# Patient Record
Sex: MALE | HISPANIC OR LATINO | Employment: FULL TIME | URBAN - METROPOLITAN AREA
[De-identification: names, ages, dates, MRNs, and addresses within clinical notes are randomized per-mention and may not be internally consistent; named-entity substitution may affect disease eponyms.]

---

## 2022-08-07 NOTE — TELEPHONE ENCOUNTER
FUTURE VISIT INFORMATION      FUTURE VISIT INFORMATION:    Date: 9/8/22    Time: 8:20AM    Location: [facility]  REFERRAL INFORMATION:    Referring provider:      Referring providers clinic:     Reason for visit/diagnosis  Chronic hearing loss in left ear. Audio prior. Appt per pt. Pt will call back with insurance information.    RECORDS REQUESTED FROM:       Clinic name Comments Records Status Imaging Status   [facility]  11/7/18 note from Mari Chappell MD  Care everywhere

## 2022-09-07 DIAGNOSIS — Z01.10 ENCOUNTER FOR HEARING TEST: Primary | ICD-10-CM

## 2022-09-08 ENCOUNTER — OFFICE VISIT (OUTPATIENT)
Dept: OTOLARYNGOLOGY | Facility: CLINIC | Age: 27
End: 2022-09-08

## 2022-09-08 ENCOUNTER — PRE VISIT (OUTPATIENT)
Dept: OTOLARYNGOLOGY | Facility: CLINIC | Age: 27
End: 2022-09-08

## 2022-09-08 ENCOUNTER — OFFICE VISIT (OUTPATIENT)
Dept: AUDIOLOGY | Facility: CLINIC | Age: 27
End: 2022-09-08

## 2022-09-08 VITALS
WEIGHT: 159 LBS | BODY MASS INDEX: 26.49 KG/M2 | OXYGEN SATURATION: 99 % | TEMPERATURE: 97.9 F | SYSTOLIC BLOOD PRESSURE: 127 MMHG | DIASTOLIC BLOOD PRESSURE: 89 MMHG | HEIGHT: 65 IN | HEART RATE: 75 BPM

## 2022-09-08 DIAGNOSIS — H90.42 SENSORINEURAL HEARING LOSS (SNHL) OF LEFT EAR WITH UNRESTRICTED HEARING OF RIGHT EAR: Primary | ICD-10-CM

## 2022-09-08 DIAGNOSIS — H91.92: Primary | ICD-10-CM

## 2022-09-08 PROCEDURE — 92557 COMPREHENSIVE HEARING TEST: CPT | Performed by: AUDIOLOGIST

## 2022-09-08 PROCEDURE — 92565 STENGER TEST PURE TONE: CPT | Performed by: AUDIOLOGIST

## 2022-09-08 PROCEDURE — 99203 OFFICE O/P NEW LOW 30 MIN: CPT | Performed by: REGISTERED NURSE

## 2022-09-08 PROCEDURE — 92550 TYMPANOMETRY & REFLEX THRESH: CPT | Performed by: AUDIOLOGIST

## 2022-09-08 ASSESSMENT — PAIN SCALES - GENERAL: PAINLEVEL: NO PAIN (0)

## 2022-09-08 NOTE — PATIENT INSTRUCTIONS
- You were seen in the ENT Clinic today by Vanessa Sen NP.   - Schedule MRI. Will call with results once available.  - Please send a [employer] message or call the ENT clinic at [phone] with any questions or concerns.

## 2022-09-08 NOTE — LETTER
Date:September 9, 2022      Patient was self referred, no letter generated. Do not send.        [facility] Health Information

## 2022-09-08 NOTE — NURSING NOTE
"Chief Complaint   Patient presents with     Consult     Hearing loss -left ear      Blood pressure 127/89, pulse 75, temperature 97.9  F (36.6  C), height 1.66 m (5' 5.35\"), weight 72.1 kg (159 lb), SpO2 99 %.    Truong García LPN    "

## 2022-09-08 NOTE — PROGRESS NOTES
"  Otolaryngology Clinic  September 8, 2022    Chief Complaint:   Left hearing loss.       History of Present Illness:   Reinier Mckeon is a 27 year old male who presents today for evaluation of left hearing loss. Patient's history is obtained with assistance of an in person .  Patient states that he has noticed a gradual left-sided hearing loss became more severe about a year ago.  Has not been able to hear out of the left ear for about a year.  Clinic notes from outside clinic in 2018 document left sided hearing concerns, however, no formal audiogram was completed.     Patient states that he does have some dizziness that only lasts minutes at a time this happens about once every few weeks.  Patient denies any room spinning vertigo.  Also reports feeling like he is having some memory issues that have worsened over the last year.  Patient does have left-sided tinnitus which is more noticeable when he tries to listen to music with bilateral headphones.  Intermittent left aural pressure.  Patient denies any facial numbness/weakness or history of Bell's palsy.  Denies any family history of hearing loss or significant loud noise exposure.  Denies history of significant head trauma.    Past Medical History:  No past medical history on file.    Past Surgical History:  No past surgical history on file.    Medications:  No current outpatient medications on file.       Allergies:  No Known Allergies     Social History:  Social History     Tobacco Use     Smoking status: Current Some Day Smoker     Smokeless tobacco: Never Used     Tobacco comment: smokes socially once per month        ROS: 10 point ROS neg other than the symptoms noted above in the HPI.    Physical Exam:    /89   Pulse 75   Temp 97.9  F (36.6  C)   Ht 1.66 m (5' 5.35\")   Wt 72.1 kg (159 lb)   SpO2 99%   BMI 26.17 kg/m       Constitutional:  The patient was unaccompanied, well-groomed, and in no acute distress.   "   Neurologic: Alert and oriented x 3.  HB 1/6 bilaterally. Voice normal.    Psychiatric: The patient's affect was calm, cooperative, and appropriate.     Communication:  Normal; communicates verbally, normal voice quality.    Respiratory: Breathing comfortably without stridor or exertion of accessory muscles.    Ears: Pinnae and tragus non-tender.        Audiogram: 9/8/2022- data independently reviewed  Right ear: Normal hearing  Left ear: No response to machine output limit   WR right: 100% at 55 dB left: 0% at 105 dB  Acoustic Reflexes: Present in right ipsi and left contra.  Absent in other conditions.  Tympanograms: type A bilaterally         Assessment and Plan:  1. Complete hearing loss of left ear  Patient with 1 year history of complete hearing loss per patient report.  This seems to have been a gradual hearing loss as left hearing concerns have been reported dating back to 2018.  There does not seem to be any significant vertiginous events the patient has experienced, however, does admit to intermittent dizziness.  Denies any facial weakness or numbness.  Full facial nerve function on today's exam.    Reviewed audiogram with patient today that shows deafness in the left ear.  Reviewed possible causes including progressive Ménière's disease, sudden sensorineural hearing loss, or an acoustic neuroma.  Recommend patient proceed with MRI of the brain to rule out any retrocochlear lesion.  Will call patient with MRI results once available.    If MRI is negative, reviewed hearing rehabilitation options including a BiCros hearing aid or, possibly, a cochlear implant.  Patient would like to proceed with MRI at this time and further discuss rehabilitation options once we have MRI results.    Vanessa Sen DNP, APRN, CNP  Otolaryngology  Head & Neck Surgery  [phone]    30 minutes spent on the date of the encounter doing chart review, history and exam, documentation and further activities per the note

## 2022-09-08 NOTE — LETTER
9/8/2022       RE: Reinier Mckeon  [address] [address]     Dear Colleague,    Thank you for referring your patient, Reinier Mckeon, to the [facility] [place] at [facility]. Please see a copy of my visit note below.      Otolaryngology Clinic  September 8, 2022    Chief Complaint:   Left hearing loss.       History of Present Illness:   Reinier Mckeon is a 27 year old male who presents today for evaluation of left hearing loss. Patient's history is obtained with assistance of an in person .  Patient states that he has noticed a gradual left-sided hearing loss became more severe about a year ago.  Has not been able to hear out of the left ear for about a year.  Clinic notes from outside clinic in 2018 document left sided hearing concerns, however, no formal audiogram was completed.     Patient states that he does have some dizziness that only lasts minutes at a time this happens about once every few weeks.  Patient denies any room spinning vertigo.  Also reports feeling like he is having some memory issues that have worsened over the last year.  Patient does have left-sided tinnitus which is more noticeable when he tries to listen to music with bilateral headphones.  Intermittent left aural pressure.  Patient denies any facial numbness/weakness or history of Bell's palsy.  Denies any family history of hearing loss or significant loud noise exposure.  Denies history of significant head trauma.    Past Medical History:  No past medical history on file.    Past Surgical History:  No past surgical history on file.    Medications:  No current outpatient medications on file.       Allergies:  No Known Allergies     Social History:  Social History     Tobacco Use     Smoking status: Current Some Day Smoker     Smokeless tobacco: Never Used     Tobacco  "comment: smokes socially once per month        ROS: 10 point ROS neg other than the symptoms noted above in the HPI.    Physical Exam:    /89   Pulse 75   Temp 97.9  F (36.6  C)   Ht 1.66 m (5' 5.35\")   Wt 72.1 kg (159 lb)   SpO2 99%   BMI 26.17 kg/m       Constitutional:  The patient was unaccompanied, well-groomed, and in no acute distress.     Neurologic: Alert and oriented x 3.  HB 1/6 bilaterally. Voice normal.    Psychiatric: The patient's affect was calm, cooperative, and appropriate.     Communication:  Normal; communicates verbally, normal voice quality.    Respiratory: Breathing comfortably without stridor or exertion of accessory muscles.    Ears: Pinnae and tragus non-tender.        Audiogram: 9/8/2022- data independently reviewed  Right ear: Normal hearing  Left ear: No response to machine output limit   WR right: 100% at 55 dB left: 0% at 105 dB  Acoustic Reflexes: Present in right ipsi and left contra.  Absent in other conditions.  Tympanograms: type A bilaterally         Assessment and Plan:  1. Complete hearing loss of left ear  Patient with 1 year history of complete hearing loss per patient report.  This seems to have been a gradual hearing loss as left hearing concerns have been reported dating back to 2018.  There does not seem to be any significant vertiginous events the patient has experienced, however, does admit to intermittent dizziness.  Denies any facial weakness or numbness.  Full facial nerve function on today's exam.    Reviewed audiogram with patient today that shows deafness in the left ear.  Reviewed possible causes including progressive Ménière's disease, sudden sensorineural hearing loss, or an acoustic neuroma.  Recommend patient proceed with MRI of the brain to rule out any retrocochlear lesion.  Will call patient with MRI results once available.    If MRI is negative, reviewed hearing rehabilitation options including a BiCros hearing aid or, possibly, a cochlear " implant.  Patient would like to proceed with MRI at this time and further discuss rehabilitation options once we have MRI results.    Vanessa Sen DNP, APRN, CNP  Otolaryngology  Head & Neck Surgery  [phone]    30 minutes spent on the date of the encounter doing chart review, history and exam, documentation and further activities per the note        Again, thank you for allowing me to participate in the care of your patient.      Sincerely,    Leia Sen, NP

## 2022-09-08 NOTE — PROGRESS NOTES
AUDIOLOGY REPORT    SUMMARY: Audiology visit completed. See audiogram for results.      RECOMMENDATIONS: Follow-up with ENT.      Fern Gregory.  Licensed Audiologist  [place] #[id number]

## 2022-10-29 ENCOUNTER — ANCILLARY PROCEDURE (OUTPATIENT)
Dept: MRI IMAGING | Facility: CLINIC | Age: 27
End: 2022-10-29
Attending: REGISTERED NURSE

## 2022-10-29 DIAGNOSIS — H91.92: ICD-10-CM

## 2022-10-29 PROCEDURE — A9585 GADOBUTROL INJECTION: HCPCS | Performed by: RADIOLOGY

## 2022-10-29 PROCEDURE — 70553 MRI BRAIN STEM W/O & W/DYE: CPT | Performed by: RADIOLOGY

## 2022-10-29 RX ORDER — GADOBUTROL 604.72 MG/ML
7.5 INJECTION INTRAVENOUS ONCE
Status: COMPLETED | OUTPATIENT
Start: 2022-10-29 | End: 2022-10-29

## 2022-10-29 RX ADMIN — GADOBUTROL 7 ML: 604.72 INJECTION INTRAVENOUS at 14:29

## 2022-10-29 NOTE — DISCHARGE INSTRUCTIONS
MRI Contrast Discharge Instructions    The IV contrast you received today will pass out of your body in your  urine. This will happen in the next 24 hours. You will not feel this process.  Your urine will not change color.    Drink at least 4 extra glasses of water or juice today (unless your doctor  has restricted your fluids). This reduces the stress on your kidneys.  You may take your regular medicines.    If you are on dialysis: It is best to have dialysis today.    If you have a reaction: Most reactions happen right away. If you have  any new symptoms after leaving the hospital (such as hives or swelling),  call your hospital at the correct number below. Or call your family doctor.  If you have breathing distress or wheezing, call 911.    Special instructions: ***    I have read and understand the above information.    Signature:______________________________________ Date:___________    Staff:__________________________________________ Date:___________     Time:__________    [place] [facility] Departments:    ___[place]: [phone]  ___[facility]: [phone]  ___[place]: [phone] ___[facility]: [phone]  ___[facility]: [phone]  ___[facility]: [phone]  ___[phone]  ___[facility]: [phone]  ___[phone]